# Patient Record
Sex: FEMALE | Race: BLACK OR AFRICAN AMERICAN | NOT HISPANIC OR LATINO
[De-identification: names, ages, dates, MRNs, and addresses within clinical notes are randomized per-mention and may not be internally consistent; named-entity substitution may affect disease eponyms.]

---

## 2023-04-14 PROBLEM — Z00.00 ENCOUNTER FOR PREVENTIVE HEALTH EXAMINATION: Status: ACTIVE | Noted: 2023-04-14

## 2023-04-17 ENCOUNTER — NON-APPOINTMENT (OUTPATIENT)
Age: 28
End: 2023-04-17

## 2023-04-19 ENCOUNTER — RESULT REVIEW (OUTPATIENT)
Age: 28
End: 2023-04-19

## 2023-04-19 ENCOUNTER — NON-APPOINTMENT (OUTPATIENT)
Age: 28
End: 2023-04-19

## 2023-04-19 ENCOUNTER — APPOINTMENT (OUTPATIENT)
Dept: GYNECOLOGIC ONCOLOGY | Facility: CLINIC | Age: 28
End: 2023-04-19
Payer: MEDICAID

## 2023-04-19 VITALS
WEIGHT: 207 LBS | DIASTOLIC BLOOD PRESSURE: 72 MMHG | HEART RATE: 76 BPM | HEIGHT: 63 IN | SYSTOLIC BLOOD PRESSURE: 110 MMHG | TEMPERATURE: 97.2 F | OXYGEN SATURATION: 100 % | BODY MASS INDEX: 36.68 KG/M2

## 2023-04-19 DIAGNOSIS — Z01.818 ENCOUNTER FOR OTHER PREPROCEDURAL EXAMINATION: ICD-10-CM

## 2023-04-19 DIAGNOSIS — N92.0 EXCESSIVE AND FREQUENT MENSTRUATION WITH REGULAR CYCLE: ICD-10-CM

## 2023-04-19 PROCEDURE — 99205 OFFICE O/P NEW HI 60 MIN: CPT

## 2023-04-19 RX ORDER — SODIUM SULFATE, POTASSIUM SULFATE AND MAGNESIUM SULFATE 1.6; 3.13; 17.5 G/177ML; G/177ML; G/177ML
17.5-3.13-1.6 SOLUTION ORAL
Qty: 1 | Refills: 0 | Status: ACTIVE | COMMUNITY
Start: 2023-04-19 | End: 1900-01-01

## 2023-04-19 RX ORDER — METRONIDAZOLE 500 MG/1
500 TABLET ORAL
Qty: 6 | Refills: 0 | Status: ACTIVE | COMMUNITY
Start: 2023-04-19 | End: 1900-01-01

## 2023-04-19 RX ORDER — NEOMYCIN SULFATE 500 MG/1
500 TABLET ORAL
Qty: 6 | Refills: 0 | Status: ACTIVE | COMMUNITY
Start: 2023-04-19 | End: 1900-01-01

## 2023-04-19 NOTE — ASSESSMENT
[FreeTextEntry1] : I discussed with the patient, with the aid of diagrams, reviewed the findings on history and physical examination, and reviewed the imaging studies in detail.  We reviewed the cystic and solid component of the mass, the CA-125, and other tumor markers. \par \par We discussed that this could be ovarian cancer; however, benign and low malignant potential tumors are also in the differential diagnosis. Other etiologies of pelvic masses, such as metastatic disease from another organ site also discussed.\par \par Again, with the aid of diagrams the different surgical approaches discussed including minimally invasive and open approaches.\par \par At this point, I recommend diagnostic laparoscopy with at least LSO. We discussed the possibility of exploratory laparotomy, fertility sparing staging or tumor debulking.  If there is evidence of malignancy, I recommend omentectomy, appendectomy, possible para-aortic nodes and pelvic node dissection. We also discussed the role of tumor debulking and possible bowel resection. \par \par Complications that include, but are not limited to: bleeding, infection, injury to other organs including bowel, bladder, ureters, blood vessels, nerves; infections, blood clots, lymphedema, pneumonia, wound complications and prolonged hospital stay have all been discussed with the patient. Whenever minimally invasive surgery is attempted, there is a chance of needing to convert to laparotomy. The risk of occult injury requiring additional surgery also discussed. I have also provided her with the diagrams.  \par \par Surgical scheduling was discussed and instructions for optimization prior to surgery were given. will follow the Enhanced Recovery After Surgery (ERAS) protocol.  \par No aspirin or NSAID products for 1 week prior.\par \par Surgery recommended ASAP, but patient would prefer to wait until she returns from a trip in May.\par \par [] Review MRI with Dr. Browne\par [] CEA \par [] Pre-op labs\par [] CT abdomen/pelvis, CXR\par [] Bowel prep\par [] Refer to Dr. Robert\par [] Refer for GI- upper and colonoscopy\par [] Dx laparoscopy, LSO, possible fertility sparing staging, tumor debulking, bowel resection 5/18\par

## 2023-04-19 NOTE — HISTORY OF PRESENT ILLNESS
[FreeTextEntry1] : Problem List\par 1. Pelvic mass\par 2. Elevated 19-9: 848.7\par 3. ?????Endometrial thickness\par \par Previous Therapy\par 1. MRI 3/2023\par     a) uterus 8.6cm\par     b) small leiomyoma 0.8cm\par     c) Between the uterus and rectus a complex septated multi loculated mass with internal nodularity and enhancing fronds of soft tissue, overall measuring at least 8cm. Impression: complex pelvix, possibly ovarian etiology, mass suspicious for ovarian carcinoma \par     d) There is endometrial stripe thickening. Partial differential may include endometrial hyperplasia, carcinoma, and follow up. \par \par 2. : 14 on 4/7/23\par 3. AFP: 2.1 on 4/7/23\par

## 2023-04-19 NOTE — PHYSICAL EXAM
[Normal] : Anus and perineum: Normal sphincter tone, no masses, no prolapse. [Abnormal] : Recto-Vaginal Exam: Abnormal [de-identified] : firm nodular mass appreciate posterior to the uterus, feels external to the rectum, but adherent.

## 2023-04-19 NOTE — CHIEF COMPLAINT
[FreeTextEntry1] : 28 y/o referred from Dr. Murcia for further evaluation of pelvic mass and elevated CA 19-9. Pt states 2 months ago she went to her PCP for annual exam and was noted to have anemia which prompted her to go see her GYN. Pt denies any pelvic pain, bloating, or weight loss. She endorses heavy periods since she was in high school and occasionally passes clots. She typically will carry extra underwear with her for when she "overflows" her sanitary napkins. She has been told in the past that she is anemic. \par \par She reports she is s/p 5 iron infusions.\par \par LMP: 4/13/23, occurs every month. \par OBHX: p0\par GYNHX: hx of abnormal pap \par \par PMHX: denies\par SX: teratoma ovarian cyst removal- laparoscopic 2014\par \par MED: venofer , valtrex\par ALL:  NKDA\par \par SOCIAL: , denies any toxic habits \par FAMHX:Dad- possible cancer but she is not too sure\par \par

## 2023-04-20 ENCOUNTER — OUTPATIENT (OUTPATIENT)
Dept: OUTPATIENT SERVICES | Facility: HOSPITAL | Age: 28
LOS: 1 days | End: 2023-04-20

## 2023-04-20 ENCOUNTER — APPOINTMENT (OUTPATIENT)
Dept: CT IMAGING | Facility: CLINIC | Age: 28
End: 2023-04-20
Payer: MEDICAID

## 2023-04-20 PROCEDURE — 74177 CT ABD & PELVIS W/CONTRAST: CPT | Mod: 26

## 2023-04-21 ENCOUNTER — NON-APPOINTMENT (OUTPATIENT)
Age: 28
End: 2023-04-21

## 2023-04-21 LAB
ALBUMIN SERPL ELPH-MCNC: 4.5 G/DL
ALP BLD-CCNC: 78 U/L
ALT SERPL-CCNC: 11 U/L
ANION GAP SERPL CALC-SCNC: 14 MMOL/L
APTT BLD: 32 SEC
AST SERPL-CCNC: 10 U/L
BASOPHILS # BLD AUTO: 0.03 K/UL
BASOPHILS NFR BLD AUTO: 0.7 %
BILIRUB SERPL-MCNC: 0.3 MG/DL
BUN SERPL-MCNC: 18 MG/DL
CALCIUM SERPL-MCNC: 10 MG/DL
CEA SERPL-MCNC: 1.4 NG/ML
CHLORIDE SERPL-SCNC: 103 MMOL/L
CO2 SERPL-SCNC: 21 MMOL/L
CORE LAB BIOPSY: NORMAL
CREAT SERPL-MCNC: 0.69 MG/DL
EGFR: 120 ML/MIN/1.73M2
EOSINOPHIL # BLD AUTO: 0.11 K/UL
EOSINOPHIL NFR BLD AUTO: 2.5 %
ESTIMATED AVERAGE GLUCOSE: 117 MG/DL
GLUCOSE SERPL-MCNC: 94 MG/DL
HBA1C MFR BLD HPLC: 5.7 %
HCT VFR BLD CALC: 37 %
HGB BLD-MCNC: 11.2 G/DL
IMM GRANULOCYTES NFR BLD AUTO: 0 %
INR PPP: 1 RATIO
LYMPHOCYTES # BLD AUTO: 1.9 K/UL
LYMPHOCYTES NFR BLD AUTO: 42.9 %
MAN DIFF?: NORMAL
MCHC RBC-ENTMCNC: 29 PG
MCHC RBC-ENTMCNC: 30.3 GM/DL
MCV RBC AUTO: 95.9 FL
MONOCYTES # BLD AUTO: 0.24 K/UL
MONOCYTES NFR BLD AUTO: 5.4 %
NEUTROPHILS # BLD AUTO: 2.15 K/UL
NEUTROPHILS NFR BLD AUTO: 48.5 %
PLATELET # BLD AUTO: 270 K/UL
POTASSIUM SERPL-SCNC: 4.4 MMOL/L
PROT SERPL-MCNC: 7.7 G/DL
PT BLD: 11.7 SEC
RBC # BLD: 3.86 M/UL
RBC # FLD: 18.6 %
SODIUM SERPL-SCNC: 139 MMOL/L
WBC # FLD AUTO: 4.43 K/UL

## 2023-04-25 ENCOUNTER — OUTPATIENT (OUTPATIENT)
Dept: OUTPATIENT SERVICES | Facility: HOSPITAL | Age: 28
LOS: 1 days | Discharge: HOME | End: 2023-04-25
Payer: COMMERCIAL

## 2023-04-25 ENCOUNTER — APPOINTMENT (OUTPATIENT)
Dept: GASTROENTEROLOGY | Facility: HOSPITAL | Age: 28
End: 2023-04-25

## 2023-04-25 ENCOUNTER — RESULT REVIEW (OUTPATIENT)
Age: 28
End: 2023-04-25

## 2023-04-25 DIAGNOSIS — K20.90 ESOPHAGITIS, UNSPECIFIED WITHOUT BLEEDING: ICD-10-CM

## 2023-04-25 PROCEDURE — 43239 EGD BIOPSY SINGLE/MULTIPLE: CPT

## 2023-04-25 PROCEDURE — 88305 TISSUE EXAM BY PATHOLOGIST: CPT | Mod: 26

## 2023-04-25 PROCEDURE — 45380 COLONOSCOPY AND BIOPSY: CPT

## 2023-04-25 PROCEDURE — 88305 TISSUE EXAM BY PATHOLOGIST: CPT

## 2023-04-25 RX ORDER — OMEPRAZOLE 40 MG/1
40 CAPSULE, DELAYED RELEASE ORAL DAILY
Qty: 60 | Refills: 0 | Status: ACTIVE | COMMUNITY
Start: 2023-04-25 | End: 1900-01-01

## 2023-04-26 LAB — SURGICAL PATHOLOGY STUDY: SIGNIFICANT CHANGE UP

## 2023-05-16 RX ORDER — IBUPROFEN 800 MG/1
800 TABLET, FILM COATED ORAL 3 TIMES DAILY
Qty: 30 | Refills: 0 | Status: ACTIVE | COMMUNITY
Start: 2023-05-16 | End: 1900-01-01

## 2023-05-16 RX ORDER — OXYCODONE AND ACETAMINOPHEN 5; 325 MG/1; MG/1
5-325 TABLET ORAL
Qty: 6 | Refills: 0 | Status: ACTIVE | COMMUNITY
Start: 2023-05-16 | End: 1900-01-01

## 2023-05-16 RX ORDER — DOCUSATE SODIUM 100 MG/1
100 CAPSULE ORAL TWICE DAILY
Qty: 20 | Refills: 2 | Status: ACTIVE | COMMUNITY
Start: 2023-05-16 | End: 1900-01-01

## 2023-05-17 ENCOUNTER — OUTPATIENT (OUTPATIENT)
Dept: OUTPATIENT SERVICES | Facility: HOSPITAL | Age: 28
LOS: 1 days | End: 2023-05-17
Payer: COMMERCIAL

## 2023-05-17 ENCOUNTER — TRANSCRIPTION ENCOUNTER (OUTPATIENT)
Age: 28
End: 2023-05-17

## 2023-05-17 VITALS
HEIGHT: 63 IN | RESPIRATION RATE: 16 BRPM | TEMPERATURE: 99 F | WEIGHT: 205.25 LBS | SYSTOLIC BLOOD PRESSURE: 105 MMHG | OXYGEN SATURATION: 100 % | DIASTOLIC BLOOD PRESSURE: 70 MMHG | HEART RATE: 65 BPM

## 2023-05-17 DIAGNOSIS — Z98.890 OTHER SPECIFIED POSTPROCEDURAL STATES: Chronic | ICD-10-CM

## 2023-05-17 PROCEDURE — 71046 X-RAY EXAM CHEST 2 VIEWS: CPT

## 2023-05-17 PROCEDURE — 71046 X-RAY EXAM CHEST 2 VIEWS: CPT | Mod: 26

## 2023-05-17 NOTE — ASU PATIENT PROFILE, ADULT - FALL HARM RISK - HARM RISK INTERVENTIONS

## 2023-05-18 ENCOUNTER — TRANSCRIPTION ENCOUNTER (OUTPATIENT)
Age: 28
End: 2023-05-18

## 2023-05-18 ENCOUNTER — RESULT REVIEW (OUTPATIENT)
Age: 28
End: 2023-05-18

## 2023-05-18 ENCOUNTER — APPOINTMENT (OUTPATIENT)
Dept: GYNECOLOGIC ONCOLOGY | Facility: HOSPITAL | Age: 28
End: 2023-05-18

## 2023-05-18 ENCOUNTER — OUTPATIENT (OUTPATIENT)
Dept: INPATIENT UNIT | Facility: HOSPITAL | Age: 28
LOS: 1 days | Discharge: HOME | End: 2023-05-18
Payer: COMMERCIAL

## 2023-05-18 VITALS — TEMPERATURE: 97 F

## 2023-05-18 DIAGNOSIS — Z98.890 OTHER SPECIFIED POSTPROCEDURAL STATES: Chronic | ICD-10-CM

## 2023-05-18 LAB
BLD GP AB SCN SERPL QL: NEGATIVE — SIGNIFICANT CHANGE UP
GLUCOSE BLDC GLUCOMTR-MCNC: 95 MG/DL — SIGNIFICANT CHANGE UP (ref 70–99)
RH IG SCN BLD-IMP: POSITIVE — SIGNIFICANT CHANGE UP

## 2023-05-18 PROCEDURE — 88112 CYTOPATH CELL ENHANCE TECH: CPT

## 2023-05-18 PROCEDURE — 88112 CYTOPATH CELL ENHANCE TECH: CPT | Mod: 26

## 2023-05-18 PROCEDURE — 86901 BLOOD TYPING SEROLOGIC RH(D): CPT

## 2023-05-18 PROCEDURE — 86850 RBC ANTIBODY SCREEN: CPT

## 2023-05-18 PROCEDURE — 86900 BLOOD TYPING SEROLOGIC ABO: CPT

## 2023-05-18 PROCEDURE — 88305 TISSUE EXAM BY PATHOLOGIST: CPT

## 2023-05-18 PROCEDURE — 88305 TISSUE EXAM BY PATHOLOGIST: CPT | Mod: 26,59

## 2023-05-18 PROCEDURE — 58661 LAPAROSCOPY REMOVE ADNEXA: CPT | Mod: 22

## 2023-05-18 PROCEDURE — 82962 GLUCOSE BLOOD TEST: CPT

## 2023-05-18 PROCEDURE — 58662 LAPAROSCOPY EXCISE LESIONS: CPT

## 2023-05-18 PROCEDURE — 88305 TISSUE EXAM BY PATHOLOGIST: CPT | Mod: 26

## 2023-05-18 RX ORDER — GABAPENTIN 400 MG/1
300 CAPSULE ORAL ONCE
Refills: 0 | Status: COMPLETED | OUTPATIENT
Start: 2023-05-18 | End: 2023-05-18

## 2023-05-18 RX ORDER — CELECOXIB 200 MG/1
400 CAPSULE ORAL ONCE
Refills: 0 | Status: COMPLETED | OUTPATIENT
Start: 2023-05-18 | End: 2023-05-18

## 2023-05-18 RX ORDER — OXYCODONE HYDROCHLORIDE 5 MG/1
10 TABLET ORAL EVERY 6 HOURS
Refills: 0 | Status: DISCONTINUED | OUTPATIENT
Start: 2023-05-18 | End: 2023-05-18

## 2023-05-18 RX ORDER — SODIUM CHLORIDE 9 MG/ML
1000 INJECTION, SOLUTION INTRAVENOUS
Refills: 0 | Status: DISCONTINUED | OUTPATIENT
Start: 2023-05-18 | End: 2023-05-18

## 2023-05-18 RX ORDER — KETOROLAC TROMETHAMINE 30 MG/ML
30 SYRINGE (ML) INJECTION ONCE
Refills: 0 | Status: DISCONTINUED | OUTPATIENT
Start: 2023-05-18 | End: 2023-05-18

## 2023-05-18 RX ORDER — SODIUM CHLORIDE 9 MG/ML
3 INJECTION INTRAMUSCULAR; INTRAVENOUS; SUBCUTANEOUS EVERY 8 HOURS
Refills: 0 | Status: DISCONTINUED | OUTPATIENT
Start: 2023-05-18 | End: 2023-05-18

## 2023-05-18 RX ORDER — ACETAMINOPHEN 500 MG
1000 TABLET ORAL EVERY 6 HOURS
Refills: 0 | Status: DISCONTINUED | OUTPATIENT
Start: 2023-05-18 | End: 2023-05-18

## 2023-05-18 RX ORDER — ACETAMINOPHEN 500 MG
1000 TABLET ORAL ONCE
Refills: 0 | Status: COMPLETED | OUTPATIENT
Start: 2023-05-18 | End: 2023-05-18

## 2023-05-18 RX ORDER — ONDANSETRON 8 MG/1
8 TABLET, FILM COATED ORAL EVERY 6 HOURS
Refills: 0 | Status: DISCONTINUED | OUTPATIENT
Start: 2023-05-18 | End: 2023-05-18

## 2023-05-18 RX ORDER — HEPARIN SODIUM 5000 [USP'U]/ML
5000 INJECTION INTRAVENOUS; SUBCUTANEOUS ONCE
Refills: 0 | Status: COMPLETED | OUTPATIENT
Start: 2023-05-18 | End: 2023-05-18

## 2023-05-18 RX ORDER — HYDROMORPHONE HYDROCHLORIDE 2 MG/ML
0.5 INJECTION INTRAMUSCULAR; INTRAVENOUS; SUBCUTANEOUS
Refills: 0 | Status: DISCONTINUED | OUTPATIENT
Start: 2023-05-18 | End: 2023-05-18

## 2023-05-18 RX ORDER — OXYCODONE HYDROCHLORIDE 5 MG/1
5 TABLET ORAL EVERY 4 HOURS
Refills: 0 | Status: DISCONTINUED | OUTPATIENT
Start: 2023-05-18 | End: 2023-05-18

## 2023-05-18 RX ADMIN — Medication 1000 MILLIGRAM(S): at 06:49

## 2023-05-18 RX ADMIN — CELECOXIB 400 MILLIGRAM(S): 200 CAPSULE ORAL at 06:49

## 2023-05-18 RX ADMIN — GABAPENTIN 300 MILLIGRAM(S): 400 CAPSULE ORAL at 06:49

## 2023-05-18 RX ADMIN — Medication 30 MILLIGRAM(S): at 15:26

## 2023-05-18 RX ADMIN — HEPARIN SODIUM 5000 UNIT(S): 5000 INJECTION INTRAVENOUS; SUBCUTANEOUS at 06:48

## 2023-05-18 NOTE — ASU DISCHARGE PLAN (ADULT/PEDIATRIC) - NS MD DC FALL RISK RISK
For information on Fall & Injury Prevention, visit: [email or web address] OR  [email or web address] OR  https://www.cdc.gov/steadi/patient.html

## 2023-05-18 NOTE — ASU DISCHARGE PLAN (ADULT/PEDIATRIC) - CARE PROVIDER_API CALL
Marcelle Nina)  Obstetrics and Gynecology  [address] [place] [address], [address], [address]  [place], [place] [facility]  Phone: [phone]  Fax: [phone]  Follow Up Time:

## 2023-05-18 NOTE — BRIEF OPERATIVE NOTE - CONDITION POST OP
[address] [employer] [facility], [address]    MAIN OR [phone]    MAIN PRE OP [phone]    AMBULATORY PRE OP [phone]    PRE-ADMISSION TESTING [phone]       Surgery Date:   6/16/2018        Is surgery arrival time given by surgeon? NO  If NO, [facility] staff will call you between 3 and 7pm the day before your surgery with your arrival time. (If your surgery is on a Monday or Saturday, we will call you the Friday before.)    Call [phone] after 7pm Monday-Friday if you did not receive your arrival time. Answers to Common Questions   When You  Arrive   Arrive at the [address] [place] [address] [facility] on the day of your surgery register then go to the 2nd floor surgical area.    phone & dial ext [phone] or [phone] to let them know you are here for surgery  Have your insurance card, photo ID, and any copayment (if needed)     Food   and   Drink   NO food or drink after midnight the night before surgery    This means NO water, gum, mints, coffee, juice, etc.  No alcohol (beer, wine, liquor) 24 hours before and after surgery     Medicine to   TAKE   Morning of Surgery   MEDICATIONS TO TAKE THE MORNING OF SURGERY WITH A SIP OF WATER:    Amlodipine     Medicine  To  STOP   FOR PAIN   NO Aspirin for pain    NO Non-Steroidal Anti-Inflammatory Drugs (NSAIDs:   for example, Ibuprofen (Advil, Motrin), Naproxen (Aleve)   STOP herbal supplements and vitamins 1 week before surgery   You can take Tylenol - follow instructions on the bottle     Blood  Thinners    If you take Aspirin, Plavix, Coumadin, blood-thinning or anti-clot medicine, talk to your surgeon and/or follow the instructions from the doctor who told you to take that medicine     Clothing  [address] Wear loose, comfortable clothes   Wear glasses instead of contacts  [employer] money, jewelry and valuables at home   No make-up, particularly mascara, the day of surgery   REMOVE ALL piercings, rings, and jewelry - leave at home   Wear hair loose or down; no pony-tails, buns, or metal hair clips    BATHING   Follow all special bath instructions (for total joint replacement, spine and bowel surgeries.)   If you shower the morning of surgery, please do not apply any lotions, powders, or deodorants afterwards. Do not shave or trim anywhere 24 hours before surgery. Going Home  or  Spending the Night    SAME-DAY SURGERY: You must have a responsible adult drive you home and stay with you 24 hours after surgery   ADMITS: If your doctor is keeping you into the hospital after surgery, leave personal belongings/luggage in your car until you have a hospital room number. Hospital discharge time is 12 noon  Drivers must be here before 12 noon unless you are told differently         Follow all instructions so your surgery wont be cancelled. Please, be on time. If a situation occurs and you are delayed the day of surgery, call [phone] or [phone]. If your physical condition changes (like a fever, cold, flu, etc.) call your surgeon as soon as possible. The Preadmission Testing staff can be reached at [phone]. OTHER SPECIAL INSTRUCTIONS:    Special Instructions:  · Use Chlorhexidine Care Fusion wash and sponges 3 days prior to surgery as instructed. · Incentive spirometer given with instructions to practice at home and bring back to the hospital on the day of surgery. · [facility] will contact you if your Hemoglobin A1C is greater than 7.5. · Ensure/Glucerna  sample, nutritional information, and Ensure/Glucerna coupon given. · Pain pamphlet and Call Don't Fall reminder reviewed with patient.    Diabetic patients:  If allowed by your surgeon, eat a good protein snack before midnight the night before your surgery  DO NOT TAKE ANY DIABETIC MEDICINE THE MORNING OF SURGERY  Check your blood sugar the morning of surgery and if it is low you may use glucose tabs  Diabetic patients:  If allowed by your surgeon, eat a good protein snack before midnight the night before your surgery  DO NOT TAKE ANY DIABETIC MEDICINE THE MORNING OF SURGERY  Check your blood sugar the morning of surgery and if it is low you may use glucose tabs         parking is complimentary Monday - Friday 7 am - 5 pm  Bring PTA Medication list day of surgery with the last doses taken documented    The patient was contacted  in person. She  verbalize  understanding of all instructions and does not  need reinforcement. stable

## 2023-05-18 NOTE — BRIEF OPERATIVE NOTE - OPERATION/FINDINGS
5 mm trocar placed in palmers point, abdomen insufflated  10 mm trocar placed in umbilicus, and additional 5mm port placed suprapubic, under direct visualization  3 left ovarian cysts removed using wavy grasper and summers's point entry; two 5mm and one 12mm trocar placed. Normal uterus, tubes, right ovary. Enlarged left ovary with multiple dermoids cysts. Three removed, partially intact. Specimen removed through endocatch bag and morcellated through umbilicus. Hemostasis achieved with bipolar. Pelvis copiously irrigated. Umbilical fascia closed with 0 vicryl. Skin closed with monocryl. Count correct, patient cleaned and stable.

## 2023-05-18 NOTE — CHART NOTE - NSCHARTNOTEFT_GEN_A_CORE
GYN POC    Pt seen and examined at bedside. Pt reports minimal abdominal pain, tolerating PO. Not yet OOB, passing flatus, or voiding.   Pt denies any fever, chills, chest pain, SOB, nausea or vomiting     T(F): 97 (05-18-23 @ 10:11), Max: 97 (05-18-23 @ 10:11)  HR: 74 (05-18-23 @ 13:11) (56 - 78)  BP: 127/70 (05-18-23 @ 13:11) (105/70 - 129/76)  RR: 16 (05-18-23 @ 12:11) (16 - 18)  SpO2: 95% (05-18-23 @ 13:11) (95% - 100%)  Wt(kg): --    05-18 @ 07:01  -  05-18 @ 14:29  --------------------------------------------------------  IN: 250 mL / OUT: 0 mL / NET: 250 mL        acetaminophen     Tablet .. 1000 milliGRAM(s) Oral every 6 hours  HYDROmorphone  Injectable 0.5 milliGRAM(s) IV Push every 15 minutes PRN Severe Pain (7 - 10)  lactated ringers. 1000 milliLiter(s) IV Continuous <Continuous>  ondansetron Injectable 8 milliGRAM(s) IV Push every 6 hours PRN Nausea and/or Vomiting  oxyCODONE    IR 5 milliGRAM(s) Oral every 4 hours PRN Moderate Pain (4 - 6)  oxyCODONE    IR 10 milliGRAM(s) Oral every 6 hours PRN Severe Pain (7 - 10)      Physical exam:  Constitutional: NAD  Pulmonary: breathing comfortably on room air  Abdomen: incision sites clean, dry and intact. Soft, nondistended, appropriately tender per postop state  Extremities: no lower extremity edema, or calf tenderness. SCDs in place    A/P: 28 y/o s/p L/S LSO, uncomplicated    Neuro: Toradol and Tylenol standing, Oxycodone prn, Dilaudid for breakthrough  Cardio: vital signs stable, continue to monitor per protocol  Pulm: incentive spirometer at least 10 times per hour while awake   GI: Reg diet, LR 125cc/hr, Reglan, Zofran prn, Simethicone, Protonix   : s/p DAVID peguero by 1800  DVT ppx: SCDs       - Plan for d/c once meeting postoperative milestones

## 2023-05-18 NOTE — PRE-ANESTHESIA EVALUATION ADULT - NSANTHPEFT_GEN_ALL_CORE
General: AAOx3, NAD  Eyes: The sclera and conjunctiva normal, pupils equal in size.  ENT: The ears and nose were normal in appearance; oropharynx clear, moist mucus membranes.  Neck: The appearance of the neck was normal, with no gross masses or nodules.  Respiratory: Unlabored, no retractions.  CV: RRR  Neurological: No focal deficit, moves all extremities.  Exercise Tolerance:  METS>4.

## 2023-05-18 NOTE — ASU DISCHARGE PLAN (ADULT/PEDIATRIC) - ASU DC SPECIAL INSTRUCTIONSFT
- Nothing in vagina - no intercourse, tampons, or douching until cleared by your doctor.   - Clean incision by letting warm soapy water run over it during showering. Pat dry with a towel.   - Avoid swimming, tub baths, strenuous activity and heavy lifting until cleared by your doctor.   - Showering is ok.   - Continue oral pain medications as needed for pain.    - Follow up in office for your postoperative visit.  Call the office to confirm your follow up appointment.   - Call the office sooner if you develop any fever, heavy bleeding, or severe pain.  Go to the closest emergency room for any of these symptoms if you are not able to contact your doctor.

## 2023-05-19 LAB — NON-GYNECOLOGICAL CYTOLOGY STUDY: SIGNIFICANT CHANGE UP

## 2023-05-25 ENCOUNTER — APPOINTMENT (OUTPATIENT)
Dept: GYNECOLOGIC ONCOLOGY | Facility: CLINIC | Age: 28
End: 2023-05-25
Payer: MEDICAID

## 2023-05-25 LAB — SURGICAL PATHOLOGY STUDY: SIGNIFICANT CHANGE UP

## 2023-05-25 PROCEDURE — 99024 POSTOP FOLLOW-UP VISIT: CPT

## 2023-05-25 NOTE — PHYSICAL EXAM
[Normal] : No focal neurologic defects observed [de-identified] : incision sites c/d/i [Fully active, able to carry on all pre-disease performance without restriction] : Status 0 - Fully active, able to carry on all pre-disease performance without restriction

## 2023-05-25 NOTE — REASON FOR VISIT
[Home] : at home, [unfilled] , at the time of the visit. [Medical Office: ([facility])___] : at the medical office located in  [Patient] : the patient [Self] : self [FreeTextEntry1] : 1 Week post-op\par \par 28yo s/p Lpsc Left ovarian cystectomy here for 1 week post-op visit. Pt feeling well. Pain controlled. +BMs\par \par \par

## 2023-05-25 NOTE — CHIEF COMPLAINT
[FreeTextEntry1] : 26 y/o referred from Dr. Murcia for further evaluation of pelvic mass and elevated CA 19-9. Pt states 2 months ago she went to her PCP for annual exam and was noted to have anemia which prompted her to go see her GYN. Pt denies any pelvic pain, bloating, or weight loss. She endorses heavy periods since she was in high school and occasionally passes clots. She typically will carry extra underwear with her for when she "overflows" her sanitary napkins. She has been told in the past that she is anemic. \par \par She reports she is s/p 5 iron infusions.\par \par LMP: 4/13/23, occurs every month. \par OBHX: p0\par GYNHX: hx of abnormal pap \par \par PMHX: denies\par SX: teratoma ovarian cyst removal- laparoscopic 2014\par \par MED: venofer , valtrex\par ALL:  NKDA\par \par SOCIAL: , denies any toxic habits \par FAMHX:Dad- possible cancer but she is not too sure\par \par

## 2023-05-25 NOTE — HISTORY OF PRESENT ILLNESS
[FreeTextEntry1] : Problem List\par 1. Pelvic mass\par 2. Elevated 19-9: 848.7\par 3. ?????Endometrial thickness\par \par Previous Therapy\par 1. MRI 3/2023\par     a) uterus 8.6cm\par     b) small leiomyoma 0.8cm\par     c) Between the uterus and rectus a complex septated multi loculated mass with internal nodularity and enhancing fronds of soft tissue, overall measuring at least 8cm. Impression: complex pelvix, possibly ovarian etiology, mass suspicious for ovarian carcinoma \par     d) There is endometrial stripe thickening. Partial differential may include endometrial hyperplasia, carcinoma, and follow up. \par \par 2. : 14 on 4/7/23\par 3. AFP: 2.1 on 4/7/23\par \par 4. Laparoscopy, left ovarian cystectomy 5/18/23\par     a) \par

## 2023-05-25 NOTE — ASSESSMENT
[FreeTextEntry1] : S/p Laparoscopy, left ovarian cystectomy\par Doing well post-operatively\par \par \par [] post-op precautions given\par [] f/u pathology\par [] f/u with Dr. Nina in 3 weeks\par

## 2023-06-09 PROBLEM — D50.9 IRON DEFICIENCY ANEMIA, UNSPECIFIED: Chronic | Status: ACTIVE | Noted: 2023-05-17

## 2023-06-13 ENCOUNTER — APPOINTMENT (OUTPATIENT)
Dept: GYNECOLOGIC ONCOLOGY | Facility: CLINIC | Age: 28
End: 2023-06-13
Payer: MEDICAID

## 2023-06-13 VITALS
DIASTOLIC BLOOD PRESSURE: 75 MMHG | WEIGHT: 207 LBS | HEART RATE: 87 BPM | OXYGEN SATURATION: 96 % | HEIGHT: 63 IN | SYSTOLIC BLOOD PRESSURE: 131 MMHG | TEMPERATURE: 97.3 F | BODY MASS INDEX: 36.68 KG/M2

## 2023-06-13 DIAGNOSIS — R19.00 INTRA-ABDOMINAL AND PELVIC SWELLING, MASS AND LUMP, UNSPECIFIED SITE: ICD-10-CM

## 2023-06-13 PROCEDURE — 99212 OFFICE O/P EST SF 10 MIN: CPT

## 2023-06-14 PROBLEM — R19.00 PELVIC MASS: Status: ACTIVE | Noted: 2023-04-19

## 2023-06-14 NOTE — PHYSICAL EXAM
[Normal] : Examination for hernias: No hernia appreciated [de-identified] : umbilical incision with 2mm defect and clear serosanguinous drainage, no induration no erythema

## 2023-06-14 NOTE — ASSESSMENT
[FreeTextEntry1] : Patient encouraged to keep umbilical incision clean and dry and to express any fluid that collects below the skin. I explained that it is more common in obesity for seromas to form below the skin. No s/s of infection.\par \par Surgical findings and pathology discussed in detail. Patient to follow up for routine gyn care with Dr. Murcia.\par \par [] Repeat , if persistently elevated refer to GI\par \par

## 2023-06-14 NOTE — HISTORY OF PRESENT ILLNESS
[FreeTextEntry1] : Problem List\par 1. Pelvic mass\par 2. Elevated 19-9: 848.7\par 3. ??Endometrial thickness\par \par Previous Therapy\par 1. MRI 3/2023\par     a) uterus 8.6cm\par     b) small leiomyoma 0.8cm\par     c) Between the uterus and rectus a complex septated multi loculated mass with internal nodularity and enhancing fronds of soft tissue, overall measuring at least 8cm. Impression: complex pelvix, possibly ovarian etiology, mass suspicious for ovarian carcinoma \par     d) There is endometrial stripe thickening. Partial differential may include endometrial hyperplasia, carcinoma, and follow up. \par \par 2. : 14 on 4/7/23\par 3. AFP: 2.1 on 4/7/23\par \par 4. Laparoscopy, left ovarian cystectomy 5/18/23\par     a) Benign cystic teratoma. Residual ovarian parenchyma present\par

## 2023-06-14 NOTE — REASON FOR VISIT
[FreeTextEntry1] : 1 Month post-op\par \par 26yo s/p Lpsc Left ovarian cystectomy here for 1 month post-op visit. Patient reports fluid drainage from her umbilicus x a few days.\par \par \par

## 2023-06-15 ENCOUNTER — APPOINTMENT (OUTPATIENT)
Dept: GYNECOLOGIC ONCOLOGY | Facility: CLINIC | Age: 28
End: 2023-06-15
Payer: MEDICAID

## 2023-06-15 DIAGNOSIS — R97.8 OTHER ABNORMAL TUMOR MARKERS: ICD-10-CM

## 2023-06-15 PROCEDURE — ZZZZZ: CPT

## 2023-06-16 LAB — CANCER AG19-9 SERPL-ACNC: 24 U/ML

## 2023-09-18 DIAGNOSIS — Z13.1 ENCOUNTER FOR SCREENING FOR DIABETES MELLITUS: ICD-10-CM

## 2025-01-14 NOTE — PRE-ANESTHESIA EVALUATION ADULT - BMI (KG/M2)
Anesthesia Evaluation     Patient summary reviewed and Nursing notes reviewed                Airway   Mallampati: I  TM distance: >3 FB  Neck ROM: full  No difficulty expected  Dental    (+) upper dentures    Pulmonary - negative pulmonary ROS and normal exam   Cardiovascular - normal exam    Patient on routine beta blocker and Beta blocker given within 24 hours of surgery    (+) hypertension less than 2 medications, dysrhythmias Atrial Fib, hyperlipidemia    ROS comment: Patient stated that last year she had Covid and they thought she had A. Fib- had additional testing completed and was told she did not have A. fib    Neuro/Psych  (+) syncope, psychiatric history Depression  GI/Hepatic/Renal/Endo    (+) obesity, thyroid problem hypothyroidism and thyroid nodules    Musculoskeletal     Abdominal    Substance History - negative use     OB/GYN negative ob/gyn ROS         Other   arthritis,     ROS/Med Hx Other: Echo 11/4/23    Left Ventricle Normal left ventricular cavity size and wall thickness noted. All left ventricular wall segments contract normally. Left ventricular diastolic function is consistent with (grade I) impaired relaxation.  Right Ventricle Right ventricle was not well seen but appeared normal in size and function.  Left Atrium Normal left atrial size and volume noted.  Right Atrium The right atrium was not well seen, but appeared normal in size..  The diameter of the inferior vena cava is 1.4 cm. Right atrium was not well seen but appeared normal in size.  Aortic Valve The valve cusps were not well seen.  Color and Doppler studies demonstrated no regurgitation or stenosis.  Mitral Valve Mitral valve is not well visualized. Studies demonstrated no regurgitation.  Tricuspid Valve Tricuspid valve not well visualized. There was no regurgitation.  Pulmonic Valve The pulmonic valve is not well visualized.  Greater Vessels No dilation of the aortic root is present.  Pericardium There is no evidence of  36.4 pericardial effusion. .                        Anesthesia Plan    ASA 3     general   total IV anesthesia  (Patient understands anesthesia not responsible for dental damage. Risks explained including allergic reactions, BP, HR, O2 changes, aspiration, advanced airway placement. Pt verbalized understanding.)  intravenous induction     Anesthetic plan, risks, benefits, and alternatives have been provided, discussed and informed consent has been obtained with: patient.    Plan discussed with CRNA.        CODE STATUS:

## (undated) DEVICE — POSITIONER PINK PAD PIGAZZI SYSTEM XL W ARM PROTECTOR

## (undated) DEVICE — PACK GENERAL LAPAROSCOPY

## (undated) DEVICE — FORCEP RADIAL JAW 4 W NDL 2.2MM 2.8MM 240CM ORANGE DISP

## (undated) DEVICE — TIP METZENBAUM SCISSOR MONOPOLAR ENDOCUT (ORANGE)

## (undated) DEVICE — VENODYNE/SCD SLEEVE CALF MEDIUM

## (undated) DEVICE — Device

## (undated) DEVICE — TUBING STRYKER PNEUMOCLEAR HIGH FLOW HEATED

## (undated) DEVICE — INZII RETRIEVAL SYSTEM 12/15MM

## (undated) DEVICE — WARMING BLANKET UPPER ADULT

## (undated) DEVICE — SUT MONOCRYL 4-0 27" PS-2 UNDYED

## (undated) DEVICE — ENDOCATCH 10MM SPECIMEN POUCH

## (undated) DEVICE — TROCAR COVIDIEN VERSAONE FIXATION CANNULA 5MM

## (undated) DEVICE — TROCAR COVIDIEN BLUNT TIP HASSAN 10MM

## (undated) DEVICE — POSITIONER FOAM EGG CRATE ULNAR 2PCS (PINK)

## (undated) DEVICE — NDL GRASPING DISP

## (undated) DEVICE — PREP BETADINE SPONGE STICKS

## (undated) DEVICE — GLV 6.5 PROTEXIS (WHITE)

## (undated) DEVICE — SOL IRR BAG NS 0.9% 3000ML

## (undated) DEVICE — SUT VICRYL 0 27" UR-6

## (undated) DEVICE — SPONGE ENDO PEANUT 5MM

## (undated) DEVICE — PACK PERI GYN

## (undated) DEVICE — LIGASURE MARYLAND 37CM

## (undated) DEVICE — INSUFFLATION NDL COVIDIEN SURGINEEDLE VERESS 120MM

## (undated) DEVICE — TROCAR COVIDIEN VERSAPORT BLADELESS OPTICAL 12MM STANDARD

## (undated) DEVICE — TROCAR COVIDIEN VERSAPORT BLADELESS OPTICAL 5MM STANDARD

## (undated) DEVICE — TROCAR COVIDIEN BLUNT TIP HASSAN 12MM

## (undated) DEVICE — SHEARS HARMONIC HD 1000I 5MM X 36CM CURVED TIP

## (undated) DEVICE — FOLEY TRAY 16FR 5CC LF UMETER CLOSED